# Patient Record
Sex: MALE | Race: WHITE | NOT HISPANIC OR LATINO | Employment: FULL TIME | URBAN - METROPOLITAN AREA
[De-identification: names, ages, dates, MRNs, and addresses within clinical notes are randomized per-mention and may not be internally consistent; named-entity substitution may affect disease eponyms.]

---

## 2019-10-21 ENCOUNTER — OFFICE VISIT (OUTPATIENT)
Dept: URGENT CARE | Facility: CLINIC | Age: 29
End: 2019-10-21
Payer: COMMERCIAL

## 2019-10-21 VITALS
HEIGHT: 74 IN | RESPIRATION RATE: 19 BRPM | BODY MASS INDEX: 21.82 KG/M2 | HEART RATE: 92 BPM | TEMPERATURE: 98 F | DIASTOLIC BLOOD PRESSURE: 88 MMHG | OXYGEN SATURATION: 100 % | WEIGHT: 170 LBS | SYSTOLIC BLOOD PRESSURE: 136 MMHG

## 2019-10-21 DIAGNOSIS — S50.01XA CONTUSION OF RIGHT ELBOW, INITIAL ENCOUNTER: Primary | ICD-10-CM

## 2019-10-21 PROCEDURE — 99203 OFFICE O/P NEW LOW 30 MIN: CPT | Mod: S$GLB,,, | Performed by: PHYSICIAN ASSISTANT

## 2019-10-21 PROCEDURE — 3008F BODY MASS INDEX DOCD: CPT | Mod: CPTII,S$GLB,, | Performed by: PHYSICIAN ASSISTANT

## 2019-10-21 PROCEDURE — 73070 XR ELBOW 2 VIEWS RIGHT: ICD-10-PCS | Mod: FY,RT,S$GLB, | Performed by: RADIOLOGY

## 2019-10-21 PROCEDURE — 73070 X-RAY EXAM OF ELBOW: CPT | Mod: FY,RT,S$GLB, | Performed by: RADIOLOGY

## 2019-10-21 PROCEDURE — 3008F PR BODY MASS INDEX (BMI) DOCUMENTED: ICD-10-PCS | Mod: CPTII,S$GLB,, | Performed by: PHYSICIAN ASSISTANT

## 2019-10-21 PROCEDURE — 99203 PR OFFICE/OUTPT VISIT, NEW, LEVL III, 30-44 MIN: ICD-10-PCS | Mod: S$GLB,,, | Performed by: PHYSICIAN ASSISTANT

## 2019-10-21 RX ORDER — MUPIROCIN 20 MG/G
OINTMENT TOPICAL
Qty: 22 G | Refills: 1 | Status: SHIPPED | OUTPATIENT
Start: 2019-10-21

## 2019-10-21 RX ORDER — DEXTROAMPHETAMINE SACCHARATE, AMPHETAMINE ASPARTATE MONOHYDRATE, DEXTROAMPHETAMINE SULFATE AND AMPHETAMINE SULFATE 7.5; 7.5; 7.5; 7.5 MG/1; MG/1; MG/1; MG/1
30 CAPSULE, EXTENDED RELEASE ORAL 2 TIMES DAILY
COMMUNITY

## 2019-10-21 RX ORDER — NAPROXEN 500 MG/1
500 TABLET ORAL 2 TIMES DAILY WITH MEALS
Qty: 20 TABLET | Refills: 0 | Status: SHIPPED | OUTPATIENT
Start: 2019-10-21 | End: 2019-10-31

## 2019-10-22 NOTE — PROGRESS NOTES
"Subjective:       Patient ID: Bayron Adorno is a 29 y.o. male.    Vitals:  height is 6' 2" (1.88 m) and weight is 77.1 kg (170 lb). His oral temperature is 98.1 °F (36.7 °C). His blood pressure is 136/88 and his pulse is 92. His respiration is 19 and oxygen saturation is 100%.     Chief Complaint: Elbow Injury    Patient fell and hit elbow on hardwood floor 4 days ago. He has cut on elbow. He is concerned because swelling has not gotten any better. Denies n/t or limited ROM.     Elbow Injury   This is a new problem. The current episode started in the past 7 days (4 days). The problem occurs constantly. The problem has been unchanged. Pertinent negatives include no abdominal pain, anorexia, arthralgias, change in bowel habit, chest pain, chills, congestion, coughing, diaphoresis, fatigue, fever, headaches, joint swelling, myalgias, nausea, neck pain, numbness, rash, sore throat, swollen glands, urinary symptoms, vertigo, visual change, vomiting or weakness. Nothing aggravates the symptoms. He has tried nothing for the symptoms. The treatment provided no relief.       Constitution: Negative for chills, sweating, fatigue and fever.   HENT: Negative for congestion and sore throat.    Neck: Negative for neck pain and painful lymph nodes.   Cardiovascular: Negative for chest pain and leg swelling.   Eyes: Negative for double vision and blurred vision.   Respiratory: Negative for cough and shortness of breath.    Gastrointestinal: Negative for abdominal pain, nausea, vomiting and diarrhea.   Genitourinary: Negative for dysuria, frequency and urgency.   Musculoskeletal: Negative for joint pain, joint swelling, muscle cramps and muscle ache.   Skin: Negative for color change, pale, rash and erythema.   Allergic/Immunologic: Negative for seasonal allergies.   Neurological: Negative for dizziness, history of vertigo, light-headedness, passing out, headaches and numbness.   Hematologic/Lymphatic: Negative for swollen lymph " nodes, easy bruising/bleeding and history of blood clots. Does not bruise/bleed easily.   Psychiatric/Behavioral: Negative for nervous/anxious, sleep disturbance and depression. The patient is not nervous/anxious.        Objective:      Physical Exam   Constitutional: He is oriented to person, place, and time. He appears well-developed and well-nourished.   HENT:   Head: Normocephalic and atraumatic. Head is without abrasion, without contusion and without laceration.   Right Ear: External ear normal.   Left Ear: External ear normal.   Nose: Nose normal.   Mouth/Throat: Oropharynx is clear and moist and mucous membranes are normal.   Eyes: Pupils are equal, round, and reactive to light. Conjunctivae, EOM and lids are normal.   Neck: Trachea normal, full passive range of motion without pain and phonation normal. Neck supple.   Cardiovascular: Normal rate, regular rhythm and normal heart sounds.   Pulmonary/Chest: Effort normal and breath sounds normal. No stridor. No respiratory distress.   Musculoskeletal: Normal range of motion.        Right elbow: He exhibits normal range of motion and no swelling. Tenderness found. Olecranon process tenderness noted.   Neurological: He is alert and oriented to person, place, and time. He has normal strength.   Skin: Skin is warm, dry, intact and no rash. Capillary refill takes less than 2 seconds. Abrasions - upper ext.:  Elbow (right)  abrasion, burn, bruising, erythema and ecchymosis  Psychiatric: He has a normal mood and affect. His speech is normal and behavior is normal. Judgment and thought content normal. Cognition and memory are normal.   Nursing note and vitals reviewed.    Xr Elbow 2 Views Right    Result Date: 10/21/2019  EXAMINATION: XR ELBOW 2 VIEWS RIGHT CLINICAL HISTORY: Contusion of right elbow, initial encounter COMPARISON: None FINDINGS: Three views right elbow. No significant displacement of the anterior or posterior elbow fat pads.  The anterior humeral line  and radiocapitellar line are in appropriate orientation.  No acute displaced fracture or dislocation of the elbow.  No radiopaque foreign body.     1. No acute displaced fracture or dislocation of the elbow. Electronically signed by: Prosper Mckeon MD Date:    10/21/2019 Time:    20:26        Assessment:       1. Contusion of right elbow, initial encounter        Plan:         Contusion of right elbow, initial encounter  -     XR ELBOW 2 VIEWS RIGHT; Future; Expected date: 10/21/2019  -     naproxen (NAPROSYN) 500 MG tablet; Take 1 tablet (500 mg total) by mouth 2 (two) times daily with meals. for 10 days  Dispense: 20 tablet; Refill: 0  -     mupirocin (BACTROBAN) 2 % ointment; Apply to affected area 3 times daily  Dispense: 22 g; Refill: 1      Patient Instructions     Elbow Bruise  You have a bruise (contusion) of your elbow. A bruise causes local pain, swelling, and sometimes bruising. There are no broken bones. This injury takes a few days to a few weeks to heal. You may be given a sling for comfort and arm support.  You may notice color changes over the skin. It may change from reddish to bluish to greenish or yellowish before the bruising fades. The skin will then go back to its normal color.  Home care  Follow these guidelines when caring for yourself at home.  · Keep your arm elevated to reduce pain and swelling. This is most important during the first 2 days (48 hours) after the injury.  · Put an ice pack on the injured area. Do this for 20 minutes every 1 to 2 hours the first day. You can make an ice pack by wrapping a plastic bag of ice in a thin towel. You should continue to use the ice pack 3 to 4 times a day for the next 2 days. Then use the ice pack as needed to ease pain and swelling.  · Dont use a heating pad.  · Dont stick a needle into the contusion or bruising to drain it.  · You may use acetaminophen or ibuprofen to control pain, unless another pain medicine was prescribed. If you have  chronic liver or kidney disease, talk with your healthcare provider before using these medicines. Also talk with your provider if youve had a stomach ulcer or gastrointestinal bleeding.  · If a sling was provided, you may take it off to shower or bathe. Dont wear it for more than 1 week or it may cause joint stiffness.  Follow-up care  Follow up with your healthcare provider, or as advised, if you are not starting to get better within the next 3 days.  When to seek medical advice  Call your healthcare provider right away if any of these occur:  · Pain or swelling gets worse  · The back of your elbow becomes very swollen where it almost looks like a gold ball or egg-like mass is growing there. This is a sign of olecrenon bursitis or septic bursitis which may need immediate treatment.  · Redness, red streaks down the arm, warmth, or drainage from the bruise  · Hand or fingers becomes cold, blue, numb, or tingly  · New bruises, and you dont know what caused them  · Contusion doesnt heal  Date Last Reviewed: 2/1/2017 © 2000-2017 [employer]. [address] [place] [address], [place] [address]. All rights reserved. This information is not intended as a substitute for professional medical care. Always follow your healthcare professional's instructions.

## 2019-10-22 NOTE — PATIENT INSTRUCTIONS
Elbow Bruise  You have a bruise (contusion) of your elbow. A bruise causes local pain, swelling, and sometimes bruising. There are no broken bones. This injury takes a few days to a few weeks to heal. You may be given a sling for comfort and arm support.  You may notice color changes over the skin. It may change from reddish to bluish to greenish or yellowish before the bruising fades. The skin will then go back to its normal color.  Home care  Follow these guidelines when caring for yourself at home.  · Keep your arm elevated to reduce pain and swelling. This is most important during the first 2 days (48 hours) after the injury.  · Put an ice pack on the injured area. Do this for 20 minutes every 1 to 2 hours the first day. You can make an ice pack by wrapping a plastic bag of ice in a thin towel. You should continue to use the ice pack 3 to 4 times a day for the next 2 days. Then use the ice pack as needed to ease pain and swelling.  · Dont use a heating pad.  · Dont stick a needle into the contusion or bruising to drain it.  · You may use acetaminophen or ibuprofen to control pain, unless another pain medicine was prescribed. If you have chronic liver or kidney disease, talk with your healthcare provider before using these medicines. Also talk with your provider if youve had a stomach ulcer or gastrointestinal bleeding.  · If a sling was provided, you may take it off to shower or bathe. Dont wear it for more than 1 week or it may cause joint stiffness.  Follow-up care  Follow up with your healthcare provider, or as advised, if you are not starting to get better within the next 3 days.  When to seek medical advice  Call your healthcare provider right away if any of these occur:  · Pain or swelling gets worse  · The back of your elbow becomes very swollen where it almost looks like a gold ball or egg-like mass is growing there. This is a sign of olecrenon bursitis or septic bursitis which may need immediate  treatment.  · Redness, red streaks down the arm, warmth, or drainage from the bruise  · Hand or fingers becomes cold, blue, numb, or tingly  · New bruises, and you dont know what caused them  · Contusion doesnt heal  Date Last Reviewed: 2/1/2017  © 4958-7485 [employer]. [address] [place] [address], [place] [address]. All rights reserved. This information is not intended as a substitute for professional medical care. Always follow your healthcare professional's instructions.

## 2020-03-12 ENCOUNTER — HOSPITAL ENCOUNTER (EMERGENCY)
Facility: HOSPITAL | Age: 30
Discharge: HOME | End: 2020-03-13
Attending: EMERGENCY MEDICINE
Payer: COMMERCIAL

## 2020-03-12 VITALS
OXYGEN SATURATION: 98 % | TEMPERATURE: 98 F | DIASTOLIC BLOOD PRESSURE: 100 MMHG | HEART RATE: 106 BPM | RESPIRATION RATE: 16 BRPM | BODY MASS INDEX: 22.46 KG/M2 | WEIGHT: 175 LBS | SYSTOLIC BLOOD PRESSURE: 140 MMHG | HEIGHT: 74 IN

## 2020-03-12 DIAGNOSIS — S01.81XA FACIAL LACERATION, INITIAL ENCOUNTER: Primary | ICD-10-CM

## 2020-03-12 DIAGNOSIS — S01.319A COMPLEX LACERATION OF EAR, INITIAL ENCOUNTER: ICD-10-CM

## 2020-03-12 PROCEDURE — 99284 PR EMERGENCY DEPT VISIT,LEVEL IV: ICD-10-PCS | Mod: ,,, | Performed by: EMERGENCY MEDICINE

## 2020-03-12 PROCEDURE — 12054 INTMD RPR FACE/MM 7.6-12.5CM: CPT

## 2020-03-12 PROCEDURE — 25000003 PHARM REV CODE 250: Performed by: STUDENT IN AN ORGANIZED HEALTH CARE EDUCATION/TRAINING PROGRAM

## 2020-03-12 PROCEDURE — 13152 CMPLX RPR E/N/E/L 2.6-7.5 CM: CPT

## 2020-03-12 PROCEDURE — 90715 TDAP VACCINE 7 YRS/> IM: CPT | Performed by: EMERGENCY MEDICINE

## 2020-03-12 PROCEDURE — 90471 IMMUNIZATION ADMIN: CPT | Performed by: EMERGENCY MEDICINE

## 2020-03-12 PROCEDURE — 12013 RPR F/E/E/N/L/M 2.6-5.0 CM: CPT

## 2020-03-12 PROCEDURE — 25000003 PHARM REV CODE 250: Performed by: EMERGENCY MEDICINE

## 2020-03-12 PROCEDURE — 63600175 PHARM REV CODE 636 W HCPCS: Performed by: EMERGENCY MEDICINE

## 2020-03-12 PROCEDURE — 99284 EMERGENCY DEPT VISIT MOD MDM: CPT | Mod: ,,, | Performed by: EMERGENCY MEDICINE

## 2020-03-12 PROCEDURE — 99284 EMERGENCY DEPT VISIT MOD MDM: CPT | Mod: 25

## 2020-03-12 RX ORDER — DOXYCYCLINE 100 MG/1
100 CAPSULE ORAL 2 TIMES DAILY
Qty: 20 CAPSULE | Refills: 0 | Status: SHIPPED | OUTPATIENT
Start: 2020-03-12 | End: 2020-03-12 | Stop reason: SDUPTHER

## 2020-03-12 RX ORDER — BACITRACIN ZINC 500 UNIT/G
OINTMENT (GRAM) TOPICAL 2 TIMES DAILY
Qty: 30 G | Refills: 0 | Status: SHIPPED | OUTPATIENT
Start: 2020-03-12

## 2020-03-12 RX ORDER — BACITRACIN ZINC 500 UNIT/G
OINTMENT IN PACKET (EA) TOPICAL
Status: COMPLETED | OUTPATIENT
Start: 2020-03-12 | End: 2020-03-12

## 2020-03-12 RX ORDER — CIPROFLOXACIN 500 MG/1
500 TABLET ORAL 2 TIMES DAILY
Qty: 20 TABLET | Refills: 0 | Status: SHIPPED | OUTPATIENT
Start: 2020-03-12 | End: 2020-03-22

## 2020-03-12 RX ORDER — LIDOCAINE HYDROCHLORIDE AND EPINEPHRINE 10; 10 MG/ML; UG/ML
1 INJECTION, SOLUTION INFILTRATION; PERINEURAL ONCE
Status: COMPLETED | OUTPATIENT
Start: 2020-03-12 | End: 2020-03-12

## 2020-03-12 RX ORDER — LIDOCAINE HYDROCHLORIDE 10 MG/ML
5 INJECTION, SOLUTION EPIDURAL; INFILTRATION; INTRACAUDAL; PERINEURAL
Status: DISCONTINUED | OUTPATIENT
Start: 2020-03-12 | End: 2020-03-12

## 2020-03-12 RX ORDER — BUPRENORPHINE HYDROCHLORIDE AND NALOXONE HYDROCHLORIDE DIHYDRATE 2; .5 MG/1; MG/1
TABLET SUBLINGUAL EVERY 6 HOURS PRN
COMMUNITY

## 2020-03-12 RX ADMIN — BACITRACIN 1 EACH: 500 OINTMENT TOPICAL at 11:03

## 2020-03-12 RX ADMIN — CLOSTRIDIUM TETANI TOXOID ANTIGEN (FORMALDEHYDE INACTIVATED), CORYNEBACTERIUM DIPHTHERIAE TOXOID ANTIGEN (FORMALDEHYDE INACTIVATED), BORDETELLA PERTUSSIS TOXOID ANTIGEN (GLUTARALDEHYDE INACTIVATED), BORDETELLA PERTUSSIS FILAMENTOUS HEMAGGLUTININ ANTIGEN (FORMALDEHYDE INACTIVATED), BORDETELLA PERTUSSIS PERTACTIN ANTIGEN, AND BORDETELLA PERTUSSIS FIMBRIAE 2/3 ANTIGEN 0.5 ML: 5; 2; 2.5; 5; 3; 5 INJECTION, SUSPENSION INTRAMUSCULAR at 11:03

## 2020-03-12 RX ADMIN — LIDOCAINE HYDROCHLORIDE AND EPINEPHRINE 1 ML: 10; 10 INJECTION, SOLUTION INFILTRATION; PERINEURAL at 09:03

## 2020-03-13 ENCOUNTER — TELEPHONE (OUTPATIENT)
Dept: OTOLARYNGOLOGY | Facility: CLINIC | Age: 30
End: 2020-03-13

## 2020-03-13 NOTE — CONSULTS
Otolaryngology-Head & Neck Surgery           History & Physical    CHIEF COMPLAINT:  Facial Laceration      HISTORY OF PRESENT ILLNESS:  Bayron Adorno is a 29 y.o. male otherwise healthy presenting with a facial laceration that he incurred after a metal plate grazed his face while moving his work truck. Laceration involves left zygoma and extends through the tragus, lobule and postauricular sulcus. He is otherwise feeling well outside of this. Did have bleeding that subsided. No facial numbness. No LOC. No vision changes. No change in hearing. Does not remember last tetnas shot.       REVIEW OF SYSTEMS: 12 point ROS done and reviewed.     MEDICATIONS:   Reviewed and/or reconciled in EPIC    ALLERGIES:  Reviewed and/or reconciled in SGB    PAST MEDICAL/SURGICAL HISTORY:   Past Medical History:   Diagnosis Date    ADHD (attention deficit hyperactivity disorder)     No past surgical history on file.    FAMILY HISTORY:  No family history on file.    SOCIAL HISTORY:    Social History     Socioeconomic History    Marital status: Single     Spouse name: Not on file    Number of children: Not on file    Years of education: Not on file    Highest education level: Not on file   Occupational History    Not on file   Social Needs    Financial resource strain: Not on file    Food insecurity:     Worry: Not on file     Inability: Not on file    Transportation needs:     Medical: Not on file     Non-medical: Not on file   Tobacco Use    Smoking status: Never Smoker    Smokeless tobacco: Never Used   Substance and Sexual Activity    Alcohol use: Not on file    Drug use: Not on file    Sexual activity: Not on file   Lifestyle    Physical activity:     Days per week: Not on file     Minutes per session: Not on file    Stress: Not on file   Relationships    Social connections:     Talks on phone: Not on file     Gets together: Not on file     Attends Latter-day service: Not on file     Active member of club or  "organization: Not on file     Attends meetings of clubs or organizations: Not on file     Relationship status: Not on file   Other Topics Concern    Not on file   Social History Narrative    Not on file       PHYSICAL EXAM:  VITAL SIGNS:   Vitals:    03/12/20 1930   BP: (!) 140/100   BP Location: Right arm   Patient Position: Sitting   Pulse: 106   Resp: 16   Temp: 98.2 °F (36.8 °C)   TempSrc: Oral   SpO2: 98%   Weight: 79.4 kg (175 lb)   Height: 6' 2" (1.88 m)     GENERAL:  Patient appears well nourished, sitting on exam table, in no acute distress.  HEAD: Complex facial abrasion and laceration.   FACE: Deep curvilinear laceration of left zygoma. Slight weakness of left forehead but does have bilateral movement.  Laceration of left tragus, antigragus, full thickness lobule and postauricular sulcus. This was repaired primarily without issue. Please see procedure note below.   EYES: Sclera anicteric, PERRLA, EOMI  NOSE: Dorsum straight, septum midline, normal turbinate size, normal mucosa  RIGHT EAR: Pinna and external ear appears normal, EAC patent, TM intact  LEFT EAR: See above exam. Full thickness laceration of left tragus, antitragus, lobule and post auricular sulcus. Cartilage of left antitragus and conchal bowl was involved. This was repaired. See procedure note.   HEARING: Grossly intact  ORAL CAVITY: Healthy mucosa, no masses or lesions including lips, gums, floor of mouth, palate, or tongue.  OROPHARYNX: Palate intact, normal pharyngeal wall movement  NECK: Supple, no palpable nodes, no masses, trachea midline, no thyroid masses  Cardiovascular system: Pulses regular in both upper extremities, good skin turgor  CARDIOVASCULAR:  RRR  RESPIRATORY:  No wheezing or stridor.  No increased work of breathing, no use of accessory muscles.  EXTREMITIES:  2+ DP pulses b/l, no edema.  SKIN:  Warm, no lesions on exposed skin.  NEUROMUSCULAR:  Cranial nerves II-XII grossly intact.    PSYCH:  Patient is alert and " oriented to person, time, place.     LABORATORY/IMAGING STUDIES: reviewed.     Laceration repair procedure report.  After informed consent obtained. The patient was positioned and draped in standard fashion. 6cc of 1% lidocaine with epinephrine was injected to the areas surrounding the lacerations.  All the wounds were thoroughly irrigated and washed out.  An indepth examination of the wounds was conducted. . Starting with the face a 4cm laceration was present on the left zygoma. This was deep down to the midfacial muscles. No obvious facial nerve branches could be identified. The deep was approximated with 4-0 monocryl suture. The skin was closed with 5-0 nylon in a running fashion. Next we focused out attention to the ear. The deep was closed 4-0 monocryl suture. The skin of the tragus, antitragus, lobule, post auricular skin, post auricular sulcus was approximated with 5-0 nylon suture in a simple interrupted fashion. The skin of the conchal bowl was approximated with 5-0 fast gut suture. The patient was cleaned. He tolerated the procedure well. There were no complications.     ASSESSMENT/PLAN: This is a 29 y.o. male with complex full thickness facial laceration involving left zygoma, and through and through linear laceration extending from tragus, conchal bowl, antitragus, lobule and post auricular sulcus. This was repaired primarily at bedside without issue. There was exposed cartilage along the antigragus. Very slight decreased left forehead movement although there is still robust movement bilaterally.     -Tetanus shot  -Cipro PO abx for atleast 1 week.   -Wound care discussed   Peroxide cleaning BID, followed by bacitracin ointment. OK to shower.   -Tylenol and motrin for pain PRN  -Will see the patient in clinic in 1 week for suture removal.    Clinic nurse will call him in the morning.   -Remainder of dispo per SHAZIA Meehan MD  Otolaryngology-[facility]   [phone]

## 2020-03-13 NOTE — TELEPHONE ENCOUNTER
----- Message from Marcia Zuniga MA sent at 3/13/2020  7:36 AM CDT -----      ----- Message -----  From: Gomez Meehan MD  Sent: 3/12/2020  11:55 PM CDT  To: [facility]    Kori cleary,    Can we please have this patient see Mahogany in clinic next Friday for suture removal please? 3/20/20.    Thanks,  Gomez

## 2020-03-13 NOTE — ED TRIAGE NOTES
Patient presents to the ED with laceration to his left side of his face and left ear. Currently wrapped with coban and gauze and not bleed outside the bandage so bandage was not removed prior to MD assessing the patient     Patient stated he was loading materials into a truck and turned his head and got caught on a piece of metal. Stated last tetanus was about 12 years ago       LOC: The patient is awake, alert and aware of environment with an appropriate affect, the patient is oriented x 3 and speaking appropriately.   APPEARANCE: Patient appears comfortable and in no acute distress, patient is clean and well groomed. Laceration to the left side face and left ear   SKIN: The skin is warm and dry, color consistent with ethnicity, patient has normal skin turgor and moist mucus membranes, skin intact with exception of injury to left ear , no breakdown or bruising noted.   MUSCULOSKELETAL: Patient moving all extremities spontaneously, no swelling noted.  RESPIRATORY: Airway is open and patent, respirations are spontaneous, patient has a normal effort and rate, no accessory muscle use noted.  CARDIAC: Patient has a normal rate and regular rhythm, no edema noted, capillary refill < 3 seconds.   GASTRO: Pt denies any GI symptoms.    : Pt denies any pain or frequency with urination.  NEURO: Pt opens eyes spontaneously, behavior appropriate to situation, follows commands, facial expression symmetrical, bilateral hand grasp equal and even, purposeful motor response noted, normal sensation in all extremities when touched with a finger.   Past Medical History:   Diagnosis Date    ADHD (attention deficit hyperactivity disorder)        No past surgical history on file.    No family history on file.    Social History     Socioeconomic History    Marital status: Single     Spouse name: Not on file    Number of children: Not on file    Years of education: Not on file    Highest education level: Not on file   Occupational  History    Not on file   Social Needs    Financial resource strain: Not on file    Food insecurity:     Worry: Not on file     Inability: Not on file    Transportation needs:     Medical: Not on file     Non-medical: Not on file   Tobacco Use    Smoking status: Never Smoker    Smokeless tobacco: Never Used   Substance and Sexual Activity    Alcohol use: Not on file    Drug use: Not on file    Sexual activity: Not on file   Lifestyle    Physical activity:     Days per week: Not on file     Minutes per session: Not on file    Stress: Not on file   Relationships    Social connections:     Talks on phone: Not on file     Gets together: Not on file     Attends Zoroastrianism service: Not on file     Active member of club or organization: Not on file     Attends meetings of clubs or organizations: Not on file     Relationship status: Not on file   Other Topics Concern    Not on file   Social History Narrative    Not on file       Current Facility-Administered Medications   Medication Dose Route Frequency Provider Last Rate Last Dose    lidocaine-EPINEPHrine 1%-1:100,000 injection 1 mL  1 mL Intradermal Once Dianna Cabrera MD        Tdap vaccine injection 0.5 mL  0.5 mL Intramuscular vaccine x 1 dose Dianna Cabrera MD         Current Outpatient Medications   Medication Sig Dispense Refill    buprenorphine-naloxone 2-0.5 mg (SUBOXONE) 2-0.5 mg Subl Place under the tongue every 6 (six) hours as needed.      dextroamphetamine-amphetamine (ADDERALL XR) 30 MG 24 hr capsule Take 30 mg by mouth 2 (two) times daily.      mupirocin (BACTROBAN) 2 % ointment Apply to affected area 3 times daily 22 g 1       Review of patient's allergies indicates:   Allergen Reactions    Cephalosporins     Penicillins

## 2020-03-13 NOTE — ED PROVIDER NOTES
Encounter Date: 3/12/2020       History     Chief Complaint   Patient presents with    Facial Laceration     pt walked into metal sheeting. lac to left temple, left ear.  pressure applied.  unknown tetanus status     HPI     Pt is a 29 y.o. male, who presents for laceration after walking into a metal sheet. Denies LOC, other injury. Unsure of last tetanus. Endorses mild pain at left ear / face, that is non-radiating, stinging. Denies possibility of FB. Denies numbness, tingling, weakness, change in hearing or vision.    Review of patient's allergies indicates:   Allergen Reactions    Cephalosporins     Penicillins      Past Medical History:   Diagnosis Date    ADHD (attention deficit hyperactivity disorder)      No past surgical history on file.  No family history on file.  Social History     Tobacco Use    Smoking status: Never Smoker    Smokeless tobacco: Never Used   Substance Use Topics    Alcohol use: Not on file    Drug use: Not on file     Review of Systems     General: No fever.  No chills.  Eyes: No visual changes.  Head: No headache.    Integument: No rashes or lesions.  Chest: No shortness of breath.  Cardiovascular: No chest pain.  Abdomen: No abdominal pain.  No nausea or vomiting.  Urinary: No abnormal urination.  Neurologic: No focal weakness.  No numbness.  Hematologic: No easy bruising.  Endocrine: No excessive thirst or urination.      Physical Exam     Initial Vitals [03/12/20 1930]   BP Pulse Resp Temp SpO2   (!) 140/100 106 16 98.2 °F (36.8 °C) 98 %      MAP       --         Physical Exam    Appearance: No acute distress.  HEENT: Normocephalic.No conjunctival injection. EOMI. PERRL. Left forehead with 4 cm curvilinear laceration with bleeding controlled, no FB, no e/o vessel or nerve compromise. Second 3 cm laceration through ear violating the cartilage. Third laceration postauricular 1 cm superficial, linear without FB.   Neck: No JVD. Neck supple.    Chest: Non-tender. No respiratory  distress  Cardiovascular: Regular rate and rhythm. +2 radial pulses bilaterally.  Abdomen: Not distended   Musculoskeletal: Good range of motion all joints. No deformities.    Neurologic: Alert and oriented x 3.  Equal strength in upper and lower extremities bilaterally. Normal sensation. No facial droop. Normal speech.    Psych:  Appropriate, conversant   Integumentary: No rashes seen.  Good turgor.  No abrasions.  No ecchymoses.      ED Course   Procedures  Labs Reviewed - No data to display       Imaging Results    None                                          Clinical Impression:       ICD-10-CM ICD-9-CM   1. Facial laceration, initial encounter S01.81XA 873.40   2. Complex laceration of ear, initial encounter S01.319A 872.9      28 yo male presents to ED for evaluation. Exam shows VSS, afeb.  Left forehead with 4 cm curvilinear laceration with bleeding controlled, no FB, no e/o vessel or nerve compromise. Second 3 cm laceration through ear violating the cartilage. Third laceration postauricular 1 cm superficial, linear without FB. Updated tetanus. Consulted ENT for assistance with lac repairs. Please see their note for the procedures. RX: bacitracin, allergy to PCN, ordered doxy then changed to cipro for better penetration of cartilage.    Discussed results, diagnosis, and treatment plan with pt; advised close follow-up with PCP. Reviewed strict return precautions. Pt confirms understanding and ability to comply.            ED Disposition Condition    Discharge Stable        ED Prescriptions     Medication Sig Dispense Start Date End Date Auth. Provider    bacitracin 500 unit/gram Oint Apply topically 2 (two) times daily. 30 g 3/12/2020  Dianna Cabrera MD    doxycycline (VIBRAMYCIN) 100 MG Cap  (Status: Discontinued) Take 1 capsule (100 mg total) by mouth 2 (two) times daily. for 10 days 20 capsule 3/12/2020 3/12/2020 Dianna Cabrera MD    ciprofloxacin HCl (CIPRO) 500 MG tablet Take 1 tablet (500 mg  total) by mouth 2 (two) times daily. for 10 days 20 tablet 3/12/2020 3/22/2020 Dianna Cabrera MD        Follow-up Information     Follow up With Specialties Details Why Contact Info    [facility] Schedule an appointment as soon as possible for a visit in 1 week  [address] [facility] [id number]  [phone]                                     Dianna Cabrera MD  03/14/20 0053

## 2020-03-20 ENCOUNTER — OFFICE VISIT (OUTPATIENT)
Dept: OTOLARYNGOLOGY | Facility: CLINIC | Age: 30
End: 2020-03-20
Payer: COMMERCIAL

## 2020-03-20 VITALS
WEIGHT: 160.94 LBS | HEART RATE: 107 BPM | HEIGHT: 74 IN | DIASTOLIC BLOOD PRESSURE: 71 MMHG | TEMPERATURE: 98 F | BODY MASS INDEX: 20.65 KG/M2 | SYSTOLIC BLOOD PRESSURE: 131 MMHG

## 2020-03-20 DIAGNOSIS — S01.81XD FACIAL LACERATION, SUBSEQUENT ENCOUNTER: Primary | ICD-10-CM

## 2020-03-20 DIAGNOSIS — S01.312D COMPLEX LACERATION OF LEFT EAR, SUBSEQUENT ENCOUNTER: ICD-10-CM

## 2020-03-20 PROCEDURE — 99213 PR OFFICE/OUTPT VISIT, EST, LEVL III, 20-29 MIN: ICD-10-PCS | Mod: S$GLB,,, | Performed by: NURSE PRACTITIONER

## 2020-03-20 PROCEDURE — 99213 OFFICE O/P EST LOW 20 MIN: CPT | Mod: S$GLB,,, | Performed by: NURSE PRACTITIONER

## 2020-03-20 PROCEDURE — 3008F BODY MASS INDEX DOCD: CPT | Mod: CPTII,S$GLB,, | Performed by: NURSE PRACTITIONER

## 2020-03-20 PROCEDURE — 99999 PR PBB SHADOW E&M-EST. PATIENT-LVL III: ICD-10-PCS | Mod: PBBFAC,,, | Performed by: NURSE PRACTITIONER

## 2020-03-20 PROCEDURE — 99999 PR PBB SHADOW E&M-EST. PATIENT-LVL III: CPT | Mod: PBBFAC,,, | Performed by: NURSE PRACTITIONER

## 2020-03-20 PROCEDURE — 3008F PR BODY MASS INDEX (BMI) DOCUMENTED: ICD-10-PCS | Mod: CPTII,S$GLB,, | Performed by: NURSE PRACTITIONER

## 2020-03-20 NOTE — PROGRESS NOTES
Subjective:      Bayron Adorno is a 29 y.o. male who is here for suture removal of left ear. Patient states he had a left facial and ear laceration after walking into a metal sheet. He reports numbness at incision sites. He denies facial or ear pain or weakness. He denies fever or site drainage, swelling or redness. He denies any change in hearing or vision. He had left ear sutures on 3/12/20 and was recommended to have sutures removed today. He has been applying bacitracin to incision sites.       Past Medical History  He has a past medical history of ADHD (attention deficit hyperactivity disorder).    Past Surgical History  He has a past surgical history that includes Appendectomy and Repair of meniscus of knee (Right).    Family History  His family history includes Coronary artery disease in his father; No Known Problems in his mother.    Social History  He reports that he has never smoked. He has never used smokeless tobacco.    Allergies  He is allergic to cephalosporins and penicillins.    Medications  He has a current medication list which includes the following prescription(s): bacitracin, buprenorphine-naloxone 2-0.5 mg, ciprofloxacin hcl, dextroamphetamine-amphetamine, and mupirocin.    Review of Systems   Constitutional: Negative for chills, fatigue and fever.   HENT: Negative for congestion, ear discharge, ear pain, facial swelling, hearing loss, nosebleeds, postnasal drip, sinus pressure, sore throat and tinnitus.    Eyes: Negative for photophobia, redness, itching and visual disturbance.   Respiratory: Negative for apnea, cough, shortness of breath, wheezing and stridor.    Cardiovascular: Negative for chest pain and palpitations.   Gastrointestinal: Negative for diarrhea, nausea and vomiting.   Endocrine: Negative.    Genitourinary: Negative for decreased urine volume, dysuria and frequency.   Musculoskeletal: Negative for arthralgias, myalgias and neck stiffness.   Skin: Negative for rash and  "wound.   Allergic/Immunologic: Negative for environmental allergies, food allergies and immunocompromised state.   Neurological: Negative for dizziness, syncope, weakness, light-headedness and headaches.   Hematological: Negative for adenopathy. Does not bruise/bleed easily.   Psychiatric/Behavioral: Negative for confusion, decreased concentration and sleep disturbance.          Objective:     /71   Pulse 107   Temp 97.7 °F (36.5 °C) (Oral)   Ht 6' 2" (1.88 m)   Wt 73 kg (160 lb 15 oz)   BMI 20.66 kg/m²      Constitutional:   He is oriented to person, place, and time. Vital signs are normal. He appears well-developed and well-nourished. He appears alert. Normal speech.      Head:  Normocephalic and atraumatic.         Ears:    Right Ear: No lacerations. No drainage, swelling or tenderness. No foreign bodies. No mastoid tenderness. Tympanic membrane is not injected, not scarred, not perforated, not erythematous, not retracted and not bulging. No middle ear effusion. No hemotympanum.   Left Ear: No lacerations. No drainage, swelling or tenderness. No foreign bodies. No mastoid tenderness. Tympanic membrane is not injected, not scarred, not perforated, not erythematous, not retracted and not bulging.  No middle ear effusion. No hemotympanum.   Ears:      Nose:  No mucosal edema, rhinorrhea, nose lacerations, sinus tenderness, septal deviation, nasal septal hematoma or polyps. No epistaxis.  No foreign bodies. No turbinate hypertrophy.  Right sinus exhibits no maxillary sinus tenderness and no frontal sinus tenderness. Left sinus exhibits no maxillary sinus tenderness and no frontal sinus tenderness.     Neck:  Neck normal without thyromegaly masses, asymmetry, normal tracheal structure, crepitus, and tenderness and no adenopathy.     Psychiatric:   He has a normal mood and affect. His speech is normal.     Neurological:   He is alert and oriented to person, place, and time. No cranial nerve deficit. "     Skin:   No abrasions, lacerations, lesions, or rashes.       Procedure    None      Data Reviewed    No results found for: WBC  No results found for: EOSINOPHIL  No results found for: EOS  No results found for: PLT  No results found for: GLU        Assessment:     1. Facial laceration, subsequent encounter    2. Complex laceration of left ear, subsequent encounter         Plan:        I had a long discussion with the patient regarding his condition and the further workup and management options.    Left facial and ear outer sutures removed. Each facial and ear incision site is healing well.   Continue bacitracin ointment for another week.   I recommend patient avoid touching face or ear. Take precautions to avoid trauma to ear as it is still healing.   I informed patient to follow up if he experiences left facial ear drainage, swelling or redness with fever which may indication signs of infection.       Follow up if symptoms worsen or fail to improve.

## 2020-03-20 NOTE — LETTER
March 20, 2020      Gomez Meehan MD  [address]  [facility] [address]           [facility] - Otorhinolaryngology  [address]  [facility] [address]  Phone: [phone]  Fax: [phone]          Patient: Bayron Adorno   MR Number: [id number]   YOB: 1990   Date of Visit: 3/20/2020       Dear Dr. Gomez Meehan:    Thank you for referring Bayron Adorno to me for evaluation. Attached you will find relevant portions of my assessment and plan of care.    If you have questions, please do not hesitate to call me. I look forward to following Bayron Adorno along with you.    Sincerely,    Sejal Bahena, NP    Enclosure  CC:  No Recipients    If you would like to receive this communication electronically, please contact [email or web address] or [phone] to request more information on [facility] access.    For providers and/or their staff who would like to refer a patient to [facility], please contact us through our one-stop-shop provider referral line, [facility], at [phone].    If you feel you have received this communication in error or would no longer like to receive these types of communications, please e-mail [email or web address]

## 2020-03-30 ENCOUNTER — OFFICE VISIT (OUTPATIENT)
Dept: URGENT CARE | Facility: CLINIC | Age: 30
End: 2020-03-30
Payer: COMMERCIAL

## 2020-03-30 VITALS
OXYGEN SATURATION: 99 % | BODY MASS INDEX: 21.82 KG/M2 | WEIGHT: 170 LBS | HEIGHT: 74 IN | HEART RATE: 100 BPM | DIASTOLIC BLOOD PRESSURE: 85 MMHG | TEMPERATURE: 98 F | SYSTOLIC BLOOD PRESSURE: 139 MMHG

## 2020-03-30 DIAGNOSIS — S62.315A CLOSED DISPLACED FRACTURE OF BASE OF FOURTH METACARPAL BONE OF LEFT HAND, INITIAL ENCOUNTER: Primary | ICD-10-CM

## 2020-03-30 DIAGNOSIS — M79.642 LEFT HAND PAIN: ICD-10-CM

## 2020-03-30 PROCEDURE — 73130 XR HAND COMPLETE 3 VIEW LEFT: ICD-10-PCS | Mod: FY,LT,S$GLB, | Performed by: RADIOLOGY

## 2020-03-30 PROCEDURE — 73130 X-RAY EXAM OF HAND: CPT | Mod: FY,LT,S$GLB, | Performed by: RADIOLOGY

## 2020-03-30 PROCEDURE — 99214 PR OFFICE/OUTPT VISIT, EST, LEVL IV, 30-39 MIN: ICD-10-PCS | Mod: S$GLB,,, | Performed by: NURSE PRACTITIONER

## 2020-03-30 PROCEDURE — 99214 OFFICE O/P EST MOD 30 MIN: CPT | Mod: S$GLB,,, | Performed by: NURSE PRACTITIONER

## 2020-03-30 NOTE — PROGRESS NOTES
"Subjective:       Patient ID: Bayron Adorno is a 29 y.o. male.    Vitals:  height is 6' 2" (1.88 m) and weight is 77.1 kg (170 lb). His tympanic temperature is 97.9 °F (36.6 °C). His blood pressure is 139/85 and his pulse is 100. His oxygen saturation is 99%.     Chief Complaint: Hand Injury (left)    Hand Injury    His dominant hand is their left hand. The incident occurred 3 to 5 days ago (03/27/2020). The incident occurred at home. The injury mechanism was a direct blow. The pain is present in the left hand and left fingers. The quality of the pain is described as aching and shooting. The pain does not radiate. Pain scale: using it - 10, resting it 4-5. The pain has been fluctuating since the incident. Pertinent negatives include no chest pain, muscle weakness, numbness or tingling. The symptoms are aggravated by lifting and movement. He has tried ice, acetaminophen and NSAIDs for the symptoms. The treatment provided mild relief.       Constitution: Negative for fatigue.   HENT: Negative for facial swelling and facial trauma.    Neck: Negative for neck stiffness.   Cardiovascular: Negative for chest trauma and chest pain.   Eyes: Negative for eye trauma, double vision and blurred vision.   Gastrointestinal: Negative for abdominal trauma, abdominal pain and rectal bleeding.   Genitourinary: Negative for hematuria, genital trauma and pelvic pain.   Musculoskeletal: Negative for pain, trauma, joint swelling, abnormal ROM of joint and pain with walking.   Skin: Negative for color change, wound, abrasion and laceration.   Neurological: Negative for dizziness, history of vertigo, light-headedness, coordination disturbances, altered mental status, loss of consciousness and numbness.   Hematologic/Lymphatic: Negative for history of bleeding disorder.   Psychiatric/Behavioral: Negative for altered mental status.       Objective:      Physical Exam   Constitutional: He is oriented to person, place, and time. Vital signs " are normal. He appears well-developed and well-nourished. He is cooperative.  Non-toxic appearance. He does not have a sickly appearance. He does not appear ill. No distress.   HENT:   Head: Normocephalic.   Right Ear: Hearing, tympanic membrane, external ear and ear canal normal.   Left Ear: Hearing, tympanic membrane, external ear and ear canal normal.   Nose: Nose normal.   Mouth/Throat: Uvula is midline, oropharynx is clear and moist and mucous membranes are normal.   Eyes: Conjunctivae are normal.   Cardiovascular: Normal rate, regular rhythm and normal heart sounds.   Pulmonary/Chest: Effort normal.   Musculoskeletal: Normal range of motion. He exhibits edema and tenderness. He exhibits no deformity.   Neurological: He is alert and oriented to person, place, and time.   Skin: Skin is warm and dry. Capillary refill takes less than 2 seconds.   Psychiatric: He has a normal mood and affect. His behavior is normal. Judgment and thought content normal.   Nursing note and vitals reviewed.      Xr Hand Complete 3 View Left    Result Date: 3/30/2020  EXAMINATION: XR HAND COMPLETE 3 VIEW LEFT CLINICAL HISTORY: . Pain in left hand TECHNIQUE: PA, lateral, and oblique views of the left hand were performed. COMPARISON: None FINDINGS: Bones are well mineralized.  Slight ulnar minus variance.  There is an acute spiral type fracture involving the proximal metaphysis and proximal shaft of the 4th metacarpal with slight displacement and mild angulation towards the dorsal aspect.  There is overlying soft tissue swelling.  No dislocation or destructive osseous process. Joint spaces appear relatively maintained. No subcutaneous emphysema or radiodense retained foreign body.     Fourth metacarpal acute fracture, as above. Electronically signed by: Flavio Rangel MD Date:    03/30/2020 Time:    17:38   Assessment:       1. Closed displaced fracture of base of fourth metacarpal bone of left hand, initial encounter    2. Left hand pain         Plan:     DISCUSSED AND REVIEWED XRAY   [facility]  TKO SPLINT APPLIED  ORTHO REFERRAL    Closed displaced fracture of base of fourth metacarpal bone of left hand, initial encounter  -     Ambulatory referral/consult to Orthopedics  -     SPLINT FOR HOME USE    Left hand pain  -     XR HAND COMPLETE 3 VIEW LEFT; Future; Expected date: 03/30/2020  -     SPLINT FOR HOME USE         Patient Instructions   General Referral to [facility]  You were referred to [facility] to Establish Care and Management of your condition.  Please call [phone] to schedule your appointment.    Please return here or go to the Emergency Department for any concerns or worsening of condition.  Please follow up with your primary care doctor or specialist in the next 48-72hrs as needed.    If you  smoke, please stop smoking.

## 2020-03-30 NOTE — PATIENT INSTRUCTIONS
General Referral to [facility]  You were referred to [facility] to Establish Care and Management of your condition.  Please call [phone] to schedule your appointment.    Please return here or go to the Emergency Department for any concerns or worsening of condition.  Please follow up with your primary care doctor or specialist in the next 48-72hrs as needed.    If you  smoke, please stop smoking.

## 2020-03-31 ENCOUNTER — HOSPITAL ENCOUNTER (OUTPATIENT)
Dept: RADIOLOGY | Facility: HOSPITAL | Age: 30
Discharge: HOME | End: 2020-03-31
Attending: ORTHOPAEDIC SURGERY
Payer: COMMERCIAL

## 2020-03-31 ENCOUNTER — TELEPHONE (OUTPATIENT)
Dept: ORTHOPEDICS | Facility: CLINIC | Age: 30
End: 2020-03-31

## 2020-03-31 ENCOUNTER — OFFICE VISIT (OUTPATIENT)
Dept: ORTHOPEDICS | Facility: CLINIC | Age: 30
End: 2020-03-31
Payer: COMMERCIAL

## 2020-03-31 VITALS
BODY MASS INDEX: 21.82 KG/M2 | WEIGHT: 170 LBS | HEIGHT: 74 IN | HEART RATE: 88 BPM | SYSTOLIC BLOOD PRESSURE: 138 MMHG | DIASTOLIC BLOOD PRESSURE: 92 MMHG

## 2020-03-31 DIAGNOSIS — M79.642 LEFT HAND PAIN: Primary | ICD-10-CM

## 2020-03-31 DIAGNOSIS — M79.642 LEFT HAND PAIN: ICD-10-CM

## 2020-03-31 PROCEDURE — 99213 PR OFFICE/OUTPT VISIT, EST, LEVL III, 20-29 MIN: ICD-10-PCS | Mod: S$GLB,,, | Performed by: ORTHOPAEDIC SURGERY

## 2020-03-31 PROCEDURE — 99999 PR PBB SHADOW E&M-EST. PATIENT-LVL III: ICD-10-PCS | Mod: PBBFAC,,, | Performed by: ORTHOPAEDIC SURGERY

## 2020-03-31 PROCEDURE — 99999 PR PBB SHADOW E&M-EST. PATIENT-LVL III: CPT | Mod: PBBFAC,,, | Performed by: ORTHOPAEDIC SURGERY

## 2020-03-31 PROCEDURE — 3008F BODY MASS INDEX DOCD: CPT | Mod: CPTII,S$GLB,, | Performed by: ORTHOPAEDIC SURGERY

## 2020-03-31 PROCEDURE — 99213 OFFICE O/P EST LOW 20 MIN: CPT | Mod: S$GLB,,, | Performed by: ORTHOPAEDIC SURGERY

## 2020-03-31 PROCEDURE — 3008F PR BODY MASS INDEX (BMI) DOCUMENTED: ICD-10-PCS | Mod: CPTII,S$GLB,, | Performed by: ORTHOPAEDIC SURGERY

## 2020-03-31 NOTE — TELEPHONE ENCOUNTER
Voicemail left on patient's phone in reference to his hand fracture and offering him an appointment with Dr Vincent at 1:00pm today.  Call back number left in message.      Quynh Rogers LPN          ----- Message from Lety Velez MA sent at 3/31/2020  9:20 AM CDT -----  This patient has a referral for a 4th metacarpal fracture, seen yesterday at urgent care. Can you reach out to him to schedule?    Thanks,  Lety Velez  Orthopedic Clinical Assistant, [facility]

## 2020-03-31 NOTE — PROGRESS NOTES
[facility]  History & Physical  Orthopedics    SUBJECTIVE:      Chief Complaint: Left hand injury    Referring Provider: No ref. provider found     History of Present Illness:  Patient is a 29 y.o. right hand dominant male who presents today with complaints of injury to the left hand.  He notes that he sustained this 4 days ago when a piece of plywood fell onto his left dorsal hand.  He currently reports his pain is very well-controlled and written 2/10 in severity.  He with a evaluated at urgent care where x-rays revealed a fracture and he was referred for follow-up.  He has been in a TKO brace since that time and has no other complaints today.  He denies numbness or tingling.     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    Onset of symptoms/DOI was 4 days ago.    Symptoms are aggravated by activity and movement.    Symptoms are alleviated by rest and immobilization.    Symptoms consist of pain and decreased ROM.    The patient rates their pain as a 2/10.    Attempted treatment(s) and/or interventions include rest, activity modification and immobilization.     The patient denies any fevers, chills, N/V, D/C and presents for evaluation.       Past Medical History:   Diagnosis Date    ADHD (attention deficit hyperactivity disorder)      Past Surgical History:   Procedure Laterality Date    APPENDECTOMY      REPAIR OF MENISCUS OF KNEE Right      Review of patient's allergies indicates:   Allergen Reactions    Cephalosporins     Penicillins      Social History     Social History Narrative    Not on file     Family History   Problem Relation Age of Onset    No Known Problems Mother     Coronary artery disease Father          Current Outpatient Medications:     buprenorphine-naloxone 2-0.5 mg (SUBOXONE) 2-0.5 mg Subl, Place under the tongue every 6 (six) hours as needed., Disp: , Rfl:     dextroamphetamine-amphetamine (ADDERALL XR) 30 MG 24 hr capsule, Take 30 mg  "by mouth 2 (two) times daily., Disp: , Rfl:     mupirocin (BACTROBAN) 2 % ointment, Apply to affected area 3 times daily, Disp: 22 g, Rfl: 1    bacitracin 500 unit/gram Oint, Apply topically 2 (two) times daily. (Patient not taking: Reported on 3/30/2020), Disp: 30 g, Rfl: 0      Review of Systems:  Constitutional: no fever or chills  Eyes: no visual changes  ENT: no nasal congestion or sore throat  Respiratory: no cough or shortness of breath  Cardiovascular: no chest pain  Gastrointestinal: no nausea or vomiting, tolerating diet  Musculoskeletal: pain and decreased ROM    OBJECTIVE:      Vital Signs (Most Recent):  Vitals:    03/31/20 1307   BP: (!) 138/92   Pulse: 88   Weight: 77.1 kg (170 lb)   Height: 6' 2" (1.88 m)     Body mass index is 21.83 kg/m².      Physical Exam:  Constitutional: The patient appears well-developed and well-nourished. No distress.   Head: Normocephalic and atraumatic.   Nose: Nose normal.   Eyes: Conjunctivae and EOM are normal.   Neck: No tracheal deviation present.   Cardiovascular: Normal rate and intact distal pulses.    Pulmonary/Chest: Effort normal. No respiratory distress.   Abdominal: There is no guarding.   Neurological: The patient is alert.   Psychiatric: The patient has a normal mood and affect.     Left Hand/Wrist Examination:    Observation/Inspection:  Swelling  none    Deformity  none  Discoloration  none     Scars   none    Atrophy  None  Patient has tenderness to palpation along the left 4th metacarpal    HAND/WRIST EXAMINATION:  Finkelstein's Test   not assessed secondary to condition  WHAT Test    not assessed secondary to condition  Snuff box tenderness   Neg  Rivers's Test    Neg  Hook of Hamate Tenderness  Neg  CMC grind    not assessed secondary to condition  Circumduction test   not assessed secondary to condition    Neurovascular Exam:  Digits WWP, brisk CR < 3s throughout  NVI motor/LTS to M/R/U nerves, radial pulse 2+  Tinel's Test - Carpal Tunnel  not " assessed secondary to condition  Tinel's Test - Cubital Tunnel  not assessed secondary to condition  Phalen's Test    not assessed secondary to condition  Median Nerve Compression Test not assessed secondary to condition    ROM not fully assessed secondary to condition    FDP FDS intact throughout the left hand and specifically to the ring finger.  Gentle range of motion revealed no rotational deformity.    Diagnostic Results:     Xray -  x-rays of the patient's left hand demonstrate a minimally displaced left 4th metacarpal fracture  EMG - none    ASSESSMENT/PLAN:      29 y.o. yo male with left 4th metacarpal fracture  Plan:  Treatment options discussed.  The patient like to proceed with closed treatment which I feel is reasonable.  I recommended a hard cast which the patient declines today.  He prefers to continue with his TKO orthosis which I did inform him was a less stable immobilization device than a cast which he understands.  He accepts the slightly increased risk of loss reduction with the brace.  He is to use as a cast and only remove it for a quick shower during which time he is not to use the left hand at all.  Remain nonweightbearing left upper extremity.  Follow up in 6 weeks be a vertical visit with new x-rays          Teja Vincent M.D.     Please be aware that this note has been generated with the assistance of [employer] voice-to-text.  Please excuse any spelling or grammatical errors.

## 2020-10-22 ENCOUNTER — PATIENT MESSAGE (OUTPATIENT)
Dept: UROLOGY | Facility: CLINIC | Age: 30
End: 2020-10-22

## 2020-10-26 ENCOUNTER — PATIENT MESSAGE (OUTPATIENT)
Dept: UROLOGY | Facility: CLINIC | Age: 30
End: 2020-10-26

## 2021-10-29 ENCOUNTER — IMMUNIZATION (OUTPATIENT)
Dept: PRIMARY CARE CLINIC | Facility: CLINIC | Age: 31
End: 2021-10-29
Payer: COMMERCIAL

## 2021-10-29 DIAGNOSIS — Z23 NEED FOR VACCINATION: Primary | ICD-10-CM

## 2021-10-29 PROCEDURE — 0031A COVID-19,VECTOR-NR,RS-AD26,PF,0.5 ML DOSE VACCINE (JANSSEN): CPT | Mod: CV19,PBBFAC | Performed by: INTERNAL MEDICINE
